# Patient Record
Sex: FEMALE | Race: WHITE | HISPANIC OR LATINO | Employment: FULL TIME | URBAN - METROPOLITAN AREA
[De-identification: names, ages, dates, MRNs, and addresses within clinical notes are randomized per-mention and may not be internally consistent; named-entity substitution may affect disease eponyms.]

---

## 2023-05-17 ENCOUNTER — APPOINTMENT (EMERGENCY)
Dept: RADIOLOGY | Facility: HOSPITAL | Age: 53
End: 2023-05-17

## 2023-05-17 ENCOUNTER — HOSPITAL ENCOUNTER (EMERGENCY)
Facility: HOSPITAL | Age: 53
Discharge: HOME | End: 2023-05-17
Attending: EMERGENCY MEDICINE

## 2023-05-17 VITALS
OXYGEN SATURATION: 98 % | RESPIRATION RATE: 18 BRPM | TEMPERATURE: 97.7 F | WEIGHT: 198 LBS | HEART RATE: 65 BPM | SYSTOLIC BLOOD PRESSURE: 111 MMHG | DIASTOLIC BLOOD PRESSURE: 63 MMHG

## 2023-05-17 DIAGNOSIS — R10.9 ABDOMINAL PAIN: Primary | ICD-10-CM

## 2023-05-17 DIAGNOSIS — N93.9 VAGINAL BLEEDING: ICD-10-CM

## 2023-05-17 DIAGNOSIS — R11.2 NAUSEA AND VOMITING: ICD-10-CM

## 2023-05-17 LAB
ALBUMIN SERPL BCP-MCNC: 3.5 G/DL (ref 3.5–5)
ALP SERPL-CCNC: 67 U/L (ref 46–116)
ALT SERPL W P-5'-P-CCNC: 23 U/L (ref 12–78)
ANION GAP SERPL CALCULATED.3IONS-SCNC: 1 MMOL/L (ref 4–13)
AST SERPL W P-5'-P-CCNC: 12 U/L (ref 5–45)
BACTERIA UR QL AUTO: ABNORMAL /HPF
BASOPHILS # BLD AUTO: 0.05 THOUSANDS/ÂΜL (ref 0–0.1)
BASOPHILS NFR BLD AUTO: 1 % (ref 0–1)
BILIRUB SERPL-MCNC: 0.74 MG/DL (ref 0.2–1)
BILIRUB UR QL STRIP: NEGATIVE
BUN SERPL-MCNC: 13 MG/DL (ref 5–25)
CALCIUM SERPL-MCNC: 8.4 MG/DL (ref 8.3–10.1)
CHLORIDE SERPL-SCNC: 111 MMOL/L (ref 96–108)
CLARITY UR: CLEAR
CO2 SERPL-SCNC: 24 MMOL/L (ref 21–32)
COLOR UR: YELLOW
CREAT SERPL-MCNC: 0.73 MG/DL (ref 0.6–1.3)
EOSINOPHIL # BLD AUTO: 0.09 THOUSAND/ÂΜL (ref 0–0.61)
EOSINOPHIL NFR BLD AUTO: 1 % (ref 0–6)
ERYTHROCYTE [DISTWIDTH] IN BLOOD BY AUTOMATED COUNT: 18.6 % (ref 11.6–15.1)
EXT PREGNANCY TEST URINE: NEGATIVE
EXT. CONTROL: NORMAL
GFR SERPL CREATININE-BSD FRML MDRD: 94 ML/MIN/1.73SQ M
GLUCOSE SERPL-MCNC: 157 MG/DL (ref 65–140)
GLUCOSE UR STRIP-MCNC: NEGATIVE MG/DL
HCT VFR BLD AUTO: 33.6 % (ref 34.8–46.1)
HGB BLD-MCNC: 10.9 G/DL (ref 11.5–15.4)
HGB UR QL STRIP.AUTO: ABNORMAL
IMM GRANULOCYTES # BLD AUTO: 0.04 THOUSAND/UL (ref 0–0.2)
IMM GRANULOCYTES NFR BLD AUTO: 0 % (ref 0–2)
KETONES UR STRIP-MCNC: NEGATIVE MG/DL
LEUKOCYTE ESTERASE UR QL STRIP: NEGATIVE
LIPASE SERPL-CCNC: 80 U/L (ref 73–393)
LYMPHOCYTES # BLD AUTO: 1.44 THOUSANDS/ÂΜL (ref 0.6–4.47)
LYMPHOCYTES NFR BLD AUTO: 14 % (ref 14–44)
MCH RBC QN AUTO: 24.2 PG (ref 26.8–34.3)
MCHC RBC AUTO-ENTMCNC: 32.4 G/DL (ref 31.4–37.4)
MCV RBC AUTO: 75 FL (ref 82–98)
MONOCYTES # BLD AUTO: 0.45 THOUSAND/ÂΜL (ref 0.17–1.22)
MONOCYTES NFR BLD AUTO: 5 % (ref 4–12)
NEUTROPHILS # BLD AUTO: 7.98 THOUSANDS/ÂΜL (ref 1.85–7.62)
NEUTS SEG NFR BLD AUTO: 79 % (ref 43–75)
NITRITE UR QL STRIP: NEGATIVE
NON-SQ EPI CELLS URNS QL MICRO: ABNORMAL /HPF
NRBC BLD AUTO-RTO: 0 /100 WBCS
PH UR STRIP.AUTO: 6 [PH] (ref 4.5–8)
PLATELET # BLD AUTO: 393 THOUSANDS/UL (ref 149–390)
PMV BLD AUTO: 9 FL (ref 8.9–12.7)
POTASSIUM SERPL-SCNC: 3.7 MMOL/L (ref 3.5–5.3)
PROT SERPL-MCNC: 7 G/DL (ref 6.4–8.4)
PROT UR STRIP-MCNC: NEGATIVE MG/DL
RBC # BLD AUTO: 4.5 MILLION/UL (ref 3.81–5.12)
RBC #/AREA URNS AUTO: ABNORMAL /HPF
SODIUM SERPL-SCNC: 136 MMOL/L (ref 135–147)
SP GR UR STRIP.AUTO: <=1.005 (ref 1–1.03)
UROBILINOGEN UR QL STRIP.AUTO: 0.2 E.U./DL
WBC # BLD AUTO: 10.05 THOUSAND/UL (ref 4.31–10.16)
WBC #/AREA URNS AUTO: ABNORMAL /HPF

## 2023-05-17 RX ORDER — ONDANSETRON 2 MG/ML
4 INJECTION INTRAMUSCULAR; INTRAVENOUS ONCE
Status: COMPLETED | OUTPATIENT
Start: 2023-05-17 | End: 2023-05-17

## 2023-05-17 RX ORDER — ONDANSETRON 4 MG/1
4 TABLET, FILM COATED ORAL EVERY 6 HOURS
Qty: 12 TABLET | Refills: 0 | Status: SHIPPED | OUTPATIENT
Start: 2023-05-17

## 2023-05-17 RX ORDER — MORPHINE SULFATE 4 MG/ML
4 INJECTION, SOLUTION INTRAMUSCULAR; INTRAVENOUS ONCE
Status: COMPLETED | OUTPATIENT
Start: 2023-05-17 | End: 2023-05-17

## 2023-05-17 RX ADMIN — MORPHINE SULFATE 4 MG: 4 INJECTION INTRAVENOUS at 10:46

## 2023-05-17 RX ADMIN — ONDANSETRON 4 MG: 2 INJECTION INTRAMUSCULAR; INTRAVENOUS at 10:46

## 2023-05-17 RX ADMIN — IOHEXOL 94 ML: 350 INJECTION, SOLUTION INTRAVENOUS at 11:41

## 2023-05-17 NOTE — DISCHARGE INSTRUCTIONS
Follow-up with PCP and ObGyn for further care  Contact info provided below if needed  Use over the counter medications as stated on the bottle as needed for pain control  Take your new medications as prescribed as needed for further nausea and vomiting  Return to the ED with new or worsening symptoms

## 2023-05-17 NOTE — ED PROVIDER NOTES
History  Chief Complaint   Patient presents with   • Vaginal Bleeding     Pt states she started her period yesterday and has been having worsening lower abd cramping, lower back pain, nausea, vomiting, and using more frequent pads  PT is a 49yo F who presents for abdominal pain, N/V  Pt reports that yesterday she started her period which was at a normal time for her  She does report that it is slightly heavier than usual but otherwise normal  She states that this AM she began with severe bilateral lower abdominal cramping and several hours later began with N/V  She reports that she has vomited multiple times and state that all bouts have been non-bloody  Pt states that she sometimes feels mildly unwell during her period but states that her pain is significantly worse than usual and she does not typically get N/V  Pt reports that she took ibuprofen for the pain prior to arrival  Pt denies any recent illness or other associated symptoms  She reports she is otherwise healthy with no daily medications  None       History reviewed  No pertinent past medical history  History reviewed  No pertinent surgical history  History reviewed  No pertinent family history  I have reviewed and agree with the history as documented  E-Cigarette/Vaping     E-Cigarette/Vaping Substances     Social History     Tobacco Use   • Smoking status: Never   • Smokeless tobacco: Never   Substance Use Topics   • Alcohol use: Not Currently   • Drug use: Never        Review of Systems   Gastrointestinal: Positive for abdominal pain, nausea and vomiting  All other systems reviewed and are negative        Physical Exam  ED Triage Vitals [05/17/23 1021]   Temperature Pulse Respirations Blood Pressure SpO2   97 7 °F (36 5 °C) 80 20 150/66 97 %      Temp Source Heart Rate Source Patient Position - Orthostatic VS BP Location FiO2 (%)   Oral Monitor Sitting Right arm --      Pain Score       9             Orthostatic Vital Signs  Vitals:    05/17/23 1021 05/17/23 1130 05/17/23 1252 05/17/23 1536   BP: 150/66 110/56 101/58 111/63   Pulse: 80 88 65 65   Patient Position - Orthostatic VS: Sitting Sitting Sitting Lying       Physical Exam  Vitals reviewed  Constitutional:       General: She is in acute distress  Appearance: She is well-developed  She is not toxic-appearing or diaphoretic  Comments: Extremely uncomfortable appearing   HENT:      Head: Normocephalic and atraumatic  Right Ear: External ear normal       Left Ear: External ear normal       Nose: Nose normal       Mouth/Throat:      Pharynx: Oropharynx is clear  Eyes:      Extraocular Movements: Extraocular movements intact  Pupils: Pupils are equal, round, and reactive to light  Cardiovascular:      Rate and Rhythm: Normal rate and regular rhythm  Heart sounds: Normal heart sounds  Pulmonary:      Effort: Pulmonary effort is normal  No respiratory distress  Breath sounds: Normal breath sounds  Abdominal:      General: There is no distension  Palpations: Abdomen is soft  Tenderness: There is abdominal tenderness (diffuse)  There is no right CVA tenderness, left CVA tenderness, guarding or rebound  Musculoskeletal:         General: Normal range of motion  Cervical back: Normal range of motion and neck supple  Right lower leg: No edema  Left lower leg: No edema  Skin:     General: Skin is warm and dry  Capillary Refill: Capillary refill takes less than 2 seconds  Coloration: Skin is not pale  Findings: No erythema or rash  Neurological:      General: No focal deficit present  Mental Status: She is alert and oriented to person, place, and time  Psychiatric:         Speech: Speech normal          Behavior: Behavior is cooperative           ED Medications  Medications   ondansetron (ZOFRAN) injection 4 mg (4 mg Intravenous Given 5/17/23 1046)   morphine injection 4 mg (4 mg Intravenous Given 5/17/23 1046)   iohexol (OMNIPAQUE) 350 MG/ML injection (SINGLE-DOSE) 100 mL (94 mL Intravenous Given 5/17/23 1141)       Diagnostic Studies  Results Reviewed     Procedure Component Value Units Date/Time    Urine Microscopic [[id number]]  (Abnormal) Collected: 05/17/23 1255    Lab Status: Final result Specimen: Urine, Clean Catch Updated: 05/17/23 1321     RBC, UA 30-50 /hpf      WBC, UA None Seen /hpf      Epithelial Cells Occasional /hpf      Bacteria, UA None Seen /hpf     POCT pregnancy, urine [[id number]]  (Normal) Resulted: 05/17/23 1259    Lab Status: Final result Updated: 05/17/23 1259     EXT Preg Test, Ur Negative     Control Valid    Urine Macroscopic, POC [[id number]]  (Abnormal) Collected: 05/17/23 1255    Lab Status: Final result Specimen: Urine Updated: 05/17/23 1256     Color, UA Yellow     Clarity, UA Clear     pH, UA 6 0     Leukocytes, UA Negative     Nitrite, UA Negative     Protein, UA Negative mg/dl      Glucose, UA Negative mg/dl      Ketones, UA Negative mg/dl      Urobilinogen, UA 0 2 E U /dl      Bilirubin, UA Negative     Occult Blood, UA Large     Specific Gravity, UA <=1 005    Narrative:      CLINITEK RESULT    Comprehensive metabolic panel [[id number]]  (Abnormal) Collected: 05/17/23 1033    Lab Status: Final result Specimen: Blood from Arm, Left Updated: 05/17/23 1113     Sodium 136 mmol/L      Potassium 3 7 mmol/L      Chloride 111 mmol/L      CO2 24 mmol/L      ANION GAP 1 mmol/L      BUN 13 mg/dL      Creatinine 0 73 mg/dL      Glucose 157 mg/dL      Calcium 8 4 mg/dL      AST 12 U/L      ALT 23 U/L      Alkaline Phosphatase 67 U/L      Total Protein 7 0 g/dL      Albumin 3 5 g/dL      Total Bilirubin 0 74 mg/dL      eGFR 94 ml/min/1 73sq m     Narrative:      Bruno guidelines for Chronic Kidney Disease (CKD):   •  Stage 1 with normal or high GFR (GFR > 90 mL/min/1 73 square meters)  •  Stage 2 Mild CKD (GFR = 60-89 mL/min/1 73 square meters)  •  Stage 3A Moderate CKD (GFR = 45-59 mL/min/1 73 square meters)  •  Stage 3B Moderate CKD (GFR = 30-44 mL/min/1 73 square meters)  •  Stage 4 Severe CKD (GFR = 15-29 mL/min/1 73 square meters)  •  Stage 5 End Stage CKD (GFR <15 mL/min/1 73 square meters)  Note: GFR calculation is accurate only with a steady state creatinine    Lipase [[id number]]  (Normal) Collected: 05/17/23 1033    Lab Status: Final result Specimen: Blood from Arm, Left Updated: 05/17/23 1108     Lipase 80 u/L     CBC and differential [[id number]]  (Abnormal) Collected: 05/17/23 1033    Lab Status: Final result Specimen: Blood from Arm, Left Updated: 05/17/23 1052     WBC 10 05 Thousand/uL      RBC 4 50 Million/uL      Hemoglobin 10 9 g/dL      Hematocrit 33 6 %      MCV 75 fL      MCH 24 2 pg      MCHC 32 4 g/dL      RDW 18 6 %      MPV 9 0 fL      Platelets 646 Thousands/uL      nRBC 0 /100 WBCs      Neutrophils Relative 79 %      Immat GRANS % 0 %      Lymphocytes Relative 14 %      Monocytes Relative 5 %      Eosinophils Relative 1 %      Basophils Relative 1 %      Neutrophils Absolute 7 98 Thousands/µL      Immature Grans Absolute 0 04 Thousand/uL      Lymphocytes Absolute 1 44 Thousands/µL      Monocytes Absolute 0 45 Thousand/µL      Eosinophils Absolute 0 09 Thousand/µL      Basophils Absolute 0 05 Thousands/µL                  US pelvis complete w transvaginal   Final Result by Janice Nassar MD (05/17 1536)      Uterine adenomyosis  Workstation performed: [id number]         CT abdomen pelvis with contrast   Final Result by Virgen Hunt MD (05/17 1230)      Abnormal uterus with irregular central hypodensity  Differential includes fibroid infarction or an infiltrative process  Advise follow-up with pelvic ultrasound  No other evidence of acute abdominopelvic process  2 mm nonobstructing right renal calculus        This study demonstrates a significant  finding and was documented as such in Epic for liaison and referring practitioner notification  Workstation performed: [id number]               Procedures  Procedures      ED Course  ED Course as of 05/20/23 0924   Wed May 17, 2023   1113 Hemoglobin(!): 10 9  Improved from most recent of 9 4     1113 CBC and differential(!)  Reviewed and without actionable derangement  1113 Comprehensive metabolic panel(!)  Reviewed and without actionable derangement  1113 Lipase: 80  WNL   1114 Bedside RUQ US negative as performed by Dr Mario Riggins  1251 CT abdomen pelvis with contrast  Abnormal uterus with irregular central hypodensity  Differential includes fibroid infarction or an infiltrative process  Advise follow-up with pelvic ultrasound  No other evidence of acute abdominopelvic process  2 mm nonobstructing right renal calculus  36 Pt had pelvic US in 7/21 that showed no evidence of fibroids  1259 Leukocytes, UA: Negative   1259 Nitrite, UA: Negative  No evidence of infection  1259 Blood, UA(!): Large  Likely 2/2 menses  1300 PREGNANCY TEST URINE: Negative  Negative     1301 Pt reassessed and reports that she is feeling significantly better  Pt appears comfortable at this time  Discussed all results and plan for US  Pt agreeable to plan  1323 WBC, UA: None Seen   1323 Bacteria, UA: None Seen   1430 Awaiting US report  [place] pelvis complete w transvaginal  Uterine adenomyosis  1551 Discussed all results with pt  Pt reassessed and feeling well  Plan for DC with symptomatic treatment at home and pt agreeable  SBIRT 22yo+    Flowsheet Row Most Recent Value   Initial Alcohol Screen: US AUDIT-C     1  How often do you have a drink containing alcohol? 0 Filed at: 05/17/2023 1024   2  How many drinks containing alcohol do you have on a typical day you are drinking? 0 Filed at: 05/17/2023 1024   3b  FEMALE Any Age, or MALE 65+: How often do you have 4 or more drinks on one occassion?  0 Filed at: 05/17/2023 1024   Audit-C Score 0 Filed at: 05/17/2023 1024   OLIVIA: How many times in the past year have you    Used an illegal drug or used a prescription medication for non-medical reasons? Never Filed at: 05/17/2023 1024                Medical Decision Making  Pt is a 51yo F who presents with abd pain, N/V  Exam pertinent for uncomfortable appearing patient with diffuse abdominal tenderness  Differential diagnosis to include but not limited to menstrual cramps, pancreatitis, ovarian torsion, nephrolithiasis, cholecystitis, gastritis  Will plan for abdominal labs including lipase as well as CT A/P to r/o intraabdominal pathology  Will treat symptomatically and reassess  See ED course for results and details  Plan to discharge pt with f/u to PCP and ObGyn  Discussed returning the ED with new or worsening of symptoms  Discussed use of over the counter medications as stated on the bottle as needed for pain  Discussed taking new medication as prescribed as needed for nausea and vomiting  Pt expressed understanding of discharge instructions, return precautions, and medication instructions and is stable for discharge at this time  All questions were answered and pt was discharged without incident  Amount and/or Complexity of Data Reviewed  Labs: ordered  Decision-making details documented in ED Course  Radiology: ordered  Decision-making details documented in ED Course  Risk  Prescription drug management              Disposition  Final diagnoses:   Abdominal pain   Nausea and vomiting   Vaginal bleeding     Time reflects when diagnosis was documented in both MDM as applicable and the Disposition within this note     Time User Action Codes Description Comment    5/17/2023  3:43 PM Lubertha Nam Add [R10 9] Abdominal pain     5/17/2023  3:43 PM Lubertha Nam Add [R11 2] Nausea and vomiting     5/17/2023  3:43 PM Lubertha Nam Add [N93 9] Vaginal bleeding       ED Disposition     ED Disposition   Discharge    Condition Stable    Date/Time   Wed May 17, 2023  3:43 PM    Comment   Faye Hamilton discharge to home/self care  Follow-up Information     Follow up With Specialties Details Why Contact Info Additional Information    Infolink    [facility] Call  As needed [address] [id number]  [address] [place] [address], [id number] [place] [address]          Discharge Medication List as of 5/17/2023  3:44 PM      START taking these medications    Details   ondansetron (ZOFRAN) 4 mg tablet Take 1 tablet (4 mg total) by mouth every 6 (six) hours, Starting Wed 5/17/2023, Print           No discharge procedures on file  PDMP Review     None           ED Provider  Attending physically available and evaluated Faye Hamilton I managed the patient along with the ED Attending      Electronically Signed by         Talat Swanson MD  05/20/23 5300

## 2023-05-17 NOTE — Clinical Note
Marco A Lindsay was seen and treated in our emergency department on 5/17/2023  Diagnosis:     Cristino Cook    She may return on this date: If you have any questions or concerns, please don't hesitate to call        Woo Yeager MD    ______________________________           _______________          _______________  Hospital Representative                              Date                                Time

## 2023-05-17 NOTE — ED ATTENDING ATTESTATION
5/17/2023  I, Nieves Perez MD, saw and evaluated the patient  I have discussed the patient with the resident/non-physician practitioner and agree with the resident's/non-physician practitioner's findings, Plan of Care, and MDM as documented in the resident's/non-physician practitioner's note, except where noted  All available labs and Radiology studies were reviewed  I was present for key portions of any procedure(s) performed by the resident/non-physician practitioner and I was immediately available to provide assistance  At this point I agree with the current assessment done in the Emergency Department  I have conducted an independent evaluation of this patient a history and physical is as follows:    Patient presents to the emergency department with a chief complaint of abdominal cramps, nausea, and vomiting  The patient reports she had her normal menstrual period started yesterday at its expected time and the period was fairly similar to priors except the bleeding was somewhat heavier  Today the patient developed abdominal cramps, nausea, and vomiting  The patient reports she has vomited multiple episodes this morning  The patient reports that she typically gets nausea and abdominal cramps with her menstrual period but usually not this severe  The patient denies any chest pain, shortness of breath, fever, headache, rash, other complaints  The patient denies any diarrhea or urinary symptoms  The patient does admit to mild bilateral lower back pain which she occasionally gets with her menstrual periods  Physical exam demonstrates a female who appears to be uncomfortable but nontoxic  The eyes and ears are normal   The oral mucosa was moist   The neck was supple and nontender  Lungs are clear with equal breath sounds  The heart had a regular rate and rhythm  The abdomen was soft with minimal right lower quadrant and left lower quadrant tenderness without rebound or guarding    The back had mild lumbar paraspinal muscle tenderness without any midline tenderness to palpation or percussion  Skin had no rash  There is no focal neurologic deficits      ED Course         Critical Care Time  Procedures

## 2023-05-17 NOTE — ED PROCEDURE NOTE
Procedure  POC AAA US    Date/Time: 5/17/2023 11:17 AM  Performed by: Alex Morley MD  Authorized by: Alex Morley MD     Patient location:  ED  Performing Provider:  Resident  Procedure details:     Exam Type:  Educational    Indications: abdominal pain      Views Obtained:  Transverse proximal, transverse distal view and transverse mid view    Image quality: diagnostic      Image availability:  Images available in PACS  Findings:     Abdominal Aorta Findings: normal      Aneurysm (if present): no abdominal aortic aneurysm    Interpretation:     Aortic ultrasound impression: aorta normal    POC Biliary US    Date/Time: 5/17/2023 11:18 AM  Performed by: Alex Morley MD  Authorized by: Alex Morley MD     Patient location:  ED  Procedure details:     Exam Type:  Diagnostic    Indications: epigastric pain      Assessment for:  Cholecystitis and cholelithiasis    Views obtained: gallbladder (transverse and longitudinal)      Image quality: diagnostic      Image availability:  Images available in PACS  Findings:     Cholelithiasis: not identified      Common bile duct:  Normal    Gallbladder wall:  Normal    Pericholecystic fluid: not identified    Interpretation:     Biliary ultrasound impressions: normal gallbladder ultrasound                       Alex Morley MD  05/17/23 1118